# Patient Record
Sex: FEMALE | Race: WHITE | NOT HISPANIC OR LATINO | Employment: PART TIME | URBAN - METROPOLITAN AREA
[De-identification: names, ages, dates, MRNs, and addresses within clinical notes are randomized per-mention and may not be internally consistent; named-entity substitution may affect disease eponyms.]

---

## 2017-01-07 ENCOUNTER — HOSPITAL ENCOUNTER (EMERGENCY)
Facility: HOSPITAL | Age: 42
Discharge: HOME | End: 2017-01-07
Attending: EMERGENCY MEDICINE | Admitting: EMERGENCY MEDICINE
Payer: COMMERCIAL

## 2017-01-07 ENCOUNTER — APPOINTMENT (EMERGENCY)
Dept: ULTRASOUND IMAGING | Facility: HOSPITAL | Age: 42
End: 2017-01-07
Payer: COMMERCIAL

## 2017-01-07 VITALS
HEART RATE: 72 BPM | WEIGHT: 160 LBS | TEMPERATURE: 97.5 F | OXYGEN SATURATION: 99 % | DIASTOLIC BLOOD PRESSURE: 62 MMHG | RESPIRATION RATE: 16 BRPM | SYSTOLIC BLOOD PRESSURE: 121 MMHG

## 2017-01-07 DIAGNOSIS — O46.90 VAGINAL BLEEDING IN PREGNANCY: Primary | ICD-10-CM

## 2017-01-07 LAB
B-HCG SERPL-ACNC: ABNORMAL MIU/ML
BILIRUB UR QL STRIP: NEGATIVE
CLARITY UR: CLEAR
COLOR UR: YELLOW
COLOR, POC: NORMAL
GLUCOSE UR STRIP-MCNC: NEGATIVE MG/DL
HCG UR QL: POSITIVE
HGB UR QL STRIP.AUTO: NEGATIVE
KETONES UR STRIP-MCNC: NEGATIVE MG/DL
LEUKOCYTE ESTERASE UR QL STRIP: NEGATIVE
NITRITE UR QL STRIP: NEGATIVE
PH UR STRIP.AUTO: 7 [PH] (ref 4.5–8)
PROT UR STRIP-MCNC: NEGATIVE MG/DL
SP GR UR STRIP.AUTO: 1.02 (ref 1–1.03)
UROBILINOGEN UR QL STRIP.AUTO: 0.2 E.U./DL

## 2017-01-07 PROCEDURE — 81025 URINE PREGNANCY TEST: CPT | Performed by: PHYSICIAN ASSISTANT

## 2017-01-07 PROCEDURE — 76801 OB US < 14 WKS SINGLE FETUS: CPT

## 2017-01-07 PROCEDURE — 81002 URINALYSIS NONAUTO W/O SCOPE: CPT | Performed by: PHYSICIAN ASSISTANT

## 2017-01-07 PROCEDURE — 96374 THER/PROPH/DIAG INJ IV PUSH: CPT

## 2017-01-07 PROCEDURE — 81003 URINALYSIS AUTO W/O SCOPE: CPT

## 2017-01-07 PROCEDURE — 36415 COLL VENOUS BLD VENIPUNCTURE: CPT | Performed by: PHYSICIAN ASSISTANT

## 2017-01-07 PROCEDURE — 84702 CHORIONIC GONADOTROPIN TEST: CPT | Performed by: PHYSICIAN ASSISTANT

## 2017-01-07 PROCEDURE — 99284 EMERGENCY DEPT VISIT MOD MDM: CPT

## 2017-01-07 PROCEDURE — 96361 HYDRATE IV INFUSION ADD-ON: CPT

## 2017-01-07 RX ORDER — ONDANSETRON 2 MG/ML
4 INJECTION INTRAMUSCULAR; INTRAVENOUS ONCE
Status: COMPLETED | OUTPATIENT
Start: 2017-01-07 | End: 2017-01-07

## 2017-01-07 RX ADMIN — SODIUM CHLORIDE 1000 ML: 0.9 INJECTION, SOLUTION INTRAVENOUS at 09:42

## 2017-01-07 RX ADMIN — ONDANSETRON 4 MG: 2 INJECTION INTRAMUSCULAR; INTRAVENOUS at 09:42

## 2017-02-06 ENCOUNTER — ALLSCRIPTS OFFICE VISIT (OUTPATIENT)
Dept: OTHER | Facility: OTHER | Age: 42
End: 2017-02-06

## 2017-03-06 RX ORDER — IBUPROFEN 200 MG
200-800 TABLET ORAL EVERY 6 HOURS PRN
COMMUNITY
End: 2017-05-03

## 2017-03-07 ENCOUNTER — ANESTHESIA EVENT (OUTPATIENT)
Dept: PERIOP | Facility: HOSPITAL | Age: 42
End: 2017-03-07
Payer: COMMERCIAL

## 2017-03-08 ENCOUNTER — ANESTHESIA (OUTPATIENT)
Dept: PERIOP | Facility: HOSPITAL | Age: 42
End: 2017-03-08
Payer: COMMERCIAL

## 2017-03-08 ENCOUNTER — HOSPITAL ENCOUNTER (OUTPATIENT)
Facility: HOSPITAL | Age: 42
Setting detail: OUTPATIENT SURGERY
Discharge: HOME | End: 2017-03-08
Attending: OBSTETRICS & GYNECOLOGY | Admitting: OBSTETRICS & GYNECOLOGY
Payer: COMMERCIAL

## 2017-03-08 VITALS
WEIGHT: 160 LBS | HEIGHT: 66 IN | HEART RATE: 62 BPM | SYSTOLIC BLOOD PRESSURE: 115 MMHG | TEMPERATURE: 97.6 F | OXYGEN SATURATION: 100 % | BODY MASS INDEX: 25.71 KG/M2 | RESPIRATION RATE: 14 BRPM | DIASTOLIC BLOOD PRESSURE: 72 MMHG

## 2017-03-08 DIAGNOSIS — Z30.2 ENCOUNTER FOR STERILIZATION: ICD-10-CM

## 2017-03-08 LAB — EXT PREGNANCY TEST URINE: NEGATIVE

## 2017-03-08 PROCEDURE — 88302 TISSUE EXAM BY PATHOLOGIST: CPT | Performed by: OBSTETRICS & GYNECOLOGY

## 2017-03-08 PROCEDURE — 81025 URINE PREGNANCY TEST: CPT | Performed by: OBSTETRICS & GYNECOLOGY

## 2017-03-08 RX ORDER — ONDANSETRON 2 MG/ML
4 INJECTION INTRAMUSCULAR; INTRAVENOUS ONCE
Status: DISCONTINUED | OUTPATIENT
Start: 2017-03-08 | End: 2017-03-08 | Stop reason: HOSPADM

## 2017-03-08 RX ORDER — KETOROLAC TROMETHAMINE 30 MG/ML
INJECTION, SOLUTION INTRAMUSCULAR; INTRAVENOUS AS NEEDED
Status: DISCONTINUED | OUTPATIENT
Start: 2017-03-08 | End: 2017-03-08 | Stop reason: SURG

## 2017-03-08 RX ORDER — PROPOFOL 10 MG/ML
INJECTION, EMULSION INTRAVENOUS AS NEEDED
Status: DISCONTINUED | OUTPATIENT
Start: 2017-03-08 | End: 2017-03-08 | Stop reason: SURG

## 2017-03-08 RX ORDER — IBUPROFEN 600 MG/1
600 TABLET ORAL EVERY 6 HOURS PRN
Status: DISCONTINUED | OUTPATIENT
Start: 2017-03-08 | End: 2017-03-08 | Stop reason: HOSPADM

## 2017-03-08 RX ORDER — MIDAZOLAM HYDROCHLORIDE 1 MG/ML
INJECTION INTRAMUSCULAR; INTRAVENOUS AS NEEDED
Status: DISCONTINUED | OUTPATIENT
Start: 2017-03-08 | End: 2017-03-08 | Stop reason: SURG

## 2017-03-08 RX ORDER — FENTANYL CITRATE 50 UG/ML
INJECTION, SOLUTION INTRAMUSCULAR; INTRAVENOUS AS NEEDED
Status: DISCONTINUED | OUTPATIENT
Start: 2017-03-08 | End: 2017-03-08 | Stop reason: SURG

## 2017-03-08 RX ORDER — ONDANSETRON 2 MG/ML
INJECTION INTRAMUSCULAR; INTRAVENOUS AS NEEDED
Status: DISCONTINUED | OUTPATIENT
Start: 2017-03-08 | End: 2017-03-08 | Stop reason: SURG

## 2017-03-08 RX ORDER — IBUPROFEN 600 MG/1
600 TABLET ORAL EVERY 6 HOURS PRN
Qty: 15 TABLET | Refills: 0 | Status: SHIPPED | OUTPATIENT
Start: 2017-03-08 | End: 2017-05-03

## 2017-03-08 RX ORDER — FENTANYL CITRATE/PF 50 MCG/ML
25 SYRINGE (ML) INJECTION
Status: COMPLETED | OUTPATIENT
Start: 2017-03-08 | End: 2017-03-08

## 2017-03-08 RX ORDER — SODIUM CHLORIDE 9 MG/ML
125 INJECTION, SOLUTION INTRAVENOUS CONTINUOUS
Status: DISCONTINUED | OUTPATIENT
Start: 2017-03-08 | End: 2017-03-08 | Stop reason: HOSPADM

## 2017-03-08 RX ORDER — OXYCODONE HYDROCHLORIDE AND ACETAMINOPHEN 5; 325 MG/1; MG/1
1 TABLET ORAL EVERY 4 HOURS PRN
Status: DISCONTINUED | OUTPATIENT
Start: 2017-03-08 | End: 2017-03-08 | Stop reason: HOSPADM

## 2017-03-08 RX ORDER — BUPIVACAINE HYDROCHLORIDE 5 MG/ML
INJECTION, SOLUTION EPIDURAL; INTRACAUDAL AS NEEDED
Status: DISCONTINUED | OUTPATIENT
Start: 2017-03-08 | End: 2017-03-08 | Stop reason: HOSPADM

## 2017-03-08 RX ORDER — ROCURONIUM BROMIDE 10 MG/ML
INJECTION, SOLUTION INTRAVENOUS AS NEEDED
Status: DISCONTINUED | OUTPATIENT
Start: 2017-03-08 | End: 2017-03-08 | Stop reason: SURG

## 2017-03-08 RX ORDER — GLYCOPYRROLATE 0.2 MG/ML
INJECTION INTRAMUSCULAR; INTRAVENOUS AS NEEDED
Status: DISCONTINUED | OUTPATIENT
Start: 2017-03-08 | End: 2017-03-08 | Stop reason: SURG

## 2017-03-08 RX ORDER — MAGNESIUM HYDROXIDE 1200 MG/15ML
LIQUID ORAL AS NEEDED
Status: DISCONTINUED | OUTPATIENT
Start: 2017-03-08 | End: 2017-03-08 | Stop reason: HOSPADM

## 2017-03-08 RX ORDER — OXYCODONE HYDROCHLORIDE AND ACETAMINOPHEN 5; 325 MG/1; MG/1
1 TABLET ORAL EVERY 4 HOURS PRN
Qty: 12 TABLET | Refills: 0 | Status: SHIPPED | OUTPATIENT
Start: 2017-03-08 | End: 2017-03-11

## 2017-03-08 RX ADMIN — ONDANSETRON HYDROCHLORIDE 8 MG: 2 INJECTION, SOLUTION INTRAVENOUS at 14:56

## 2017-03-08 RX ADMIN — ROCURONIUM BROMIDE 30 MG: 10 INJECTION, SOLUTION INTRAVENOUS at 14:39

## 2017-03-08 RX ADMIN — PROPOFOL 200 MG: 10 INJECTION, EMULSION INTRAVENOUS at 14:39

## 2017-03-08 RX ADMIN — FENTANYL CITRATE 50 MCG: 50 INJECTION, SOLUTION INTRAMUSCULAR; INTRAVENOUS at 15:00

## 2017-03-08 RX ADMIN — GLYCOPYRROLATE 0.4 MG: 0.2 INJECTION, SOLUTION INTRAMUSCULAR; INTRAVENOUS at 15:26

## 2017-03-08 RX ADMIN — HYDROMORPHONE HYDROCHLORIDE 0.5 MG: 1 INJECTION, SOLUTION INTRAMUSCULAR; INTRAVENOUS; SUBCUTANEOUS at 16:32

## 2017-03-08 RX ADMIN — PROPOFOL 50 MG: 10 INJECTION, EMULSION INTRAVENOUS at 15:17

## 2017-03-08 RX ADMIN — FENTANYL CITRATE 25 MCG: 50 INJECTION INTRAMUSCULAR; INTRAVENOUS at 16:11

## 2017-03-08 RX ADMIN — KETOROLAC TROMETHAMINE 30 MG: 30 INJECTION, SOLUTION INTRAMUSCULAR at 15:28

## 2017-03-08 RX ADMIN — FENTANYL CITRATE 25 MCG: 50 INJECTION INTRAMUSCULAR; INTRAVENOUS at 16:06

## 2017-03-08 RX ADMIN — HYDROMORPHONE HYDROCHLORIDE 0.5 MG: 1 INJECTION, SOLUTION INTRAMUSCULAR; INTRAVENOUS; SUBCUTANEOUS at 16:27

## 2017-03-08 RX ADMIN — MIDAZOLAM HYDROCHLORIDE 2 MG: 1 INJECTION, SOLUTION INTRAMUSCULAR; INTRAVENOUS at 14:37

## 2017-03-08 RX ADMIN — OXYCODONE HYDROCHLORIDE AND ACETAMINOPHEN 1 TABLET: 5; 325 TABLET ORAL at 17:15

## 2017-03-08 RX ADMIN — FENTANYL CITRATE 25 MCG: 50 INJECTION INTRAMUSCULAR; INTRAVENOUS at 16:21

## 2017-03-08 RX ADMIN — NEOSTIGMINE METHYLSULFATE 3 MG: 1 INJECTION INTRAMUSCULAR; INTRAVENOUS; SUBCUTANEOUS at 15:26

## 2017-03-08 RX ADMIN — SODIUM CHLORIDE: 0.9 INJECTION, SOLUTION INTRAVENOUS at 15:24

## 2017-03-08 RX ADMIN — SODIUM CHLORIDE 125 ML/HR: 0.9 INJECTION, SOLUTION INTRAVENOUS at 14:08

## 2017-03-08 RX ADMIN — FENTANYL CITRATE 50 MCG: 50 INJECTION, SOLUTION INTRAMUSCULAR; INTRAVENOUS at 15:17

## 2017-03-08 RX ADMIN — DEXAMETHASONE SODIUM PHOSPHATE 8 MG: 10 INJECTION INTRAMUSCULAR; INTRAVENOUS at 14:56

## 2017-03-08 RX ADMIN — FENTANYL CITRATE 25 MCG: 50 INJECTION INTRAMUSCULAR; INTRAVENOUS at 16:16

## 2017-03-08 RX ADMIN — FENTANYL CITRATE 100 MCG: 50 INJECTION, SOLUTION INTRAMUSCULAR; INTRAVENOUS at 14:39

## 2017-04-17 ENCOUNTER — GENERIC CONVERSION - ENCOUNTER (OUTPATIENT)
Dept: OTHER | Facility: OTHER | Age: 42
End: 2017-04-17

## 2017-04-18 ENCOUNTER — ALLSCRIPTS OFFICE VISIT (OUTPATIENT)
Dept: OTHER | Facility: OTHER | Age: 42
End: 2017-04-18

## 2017-05-03 ENCOUNTER — APPOINTMENT (EMERGENCY)
Dept: CT IMAGING | Facility: HOSPITAL | Age: 42
End: 2017-05-03
Payer: COMMERCIAL

## 2017-05-03 ENCOUNTER — HOSPITAL ENCOUNTER (EMERGENCY)
Facility: HOSPITAL | Age: 42
Discharge: HOME | End: 2017-05-03
Attending: EMERGENCY MEDICINE
Payer: COMMERCIAL

## 2017-05-03 VITALS
SYSTOLIC BLOOD PRESSURE: 137 MMHG | BODY MASS INDEX: 25.82 KG/M2 | TEMPERATURE: 98.3 F | OXYGEN SATURATION: 100 % | DIASTOLIC BLOOD PRESSURE: 65 MMHG | WEIGHT: 160 LBS | RESPIRATION RATE: 16 BRPM | HEART RATE: 53 BPM

## 2017-05-03 DIAGNOSIS — L03.213 PERIORBITAL CELLULITIS OF LEFT EYE: ICD-10-CM

## 2017-05-03 DIAGNOSIS — H00.015 HORDEOLUM EXTERNUM LEFT LOWER EYELID: Primary | ICD-10-CM

## 2017-05-03 LAB
ANION GAP SERPL CALCULATED.3IONS-SCNC: 9 MMOL/L (ref 4–13)
BASOPHILS # BLD AUTO: 0.05 THOUSANDS/ΜL (ref 0–0.1)
BASOPHILS NFR BLD AUTO: 1 % (ref 0–1)
BUN SERPL-MCNC: 15 MG/DL (ref 5–25)
CALCIUM SERPL-MCNC: 9.8 MG/DL (ref 8.3–10.1)
CHLORIDE SERPL-SCNC: 106 MMOL/L (ref 100–108)
CO2 SERPL-SCNC: 25 MMOL/L (ref 21–32)
CREAT SERPL-MCNC: 0.8 MG/DL (ref 0.6–1.3)
EOSINOPHIL # BLD AUTO: 0.17 THOUSAND/ΜL (ref 0–0.61)
EOSINOPHIL NFR BLD AUTO: 2 % (ref 0–6)
ERYTHROCYTE [DISTWIDTH] IN BLOOD BY AUTOMATED COUNT: 13.3 % (ref 11.6–15.1)
GFR SERPL CREATININE-BSD FRML MDRD: >60 ML/MIN/1.73SQ M
GLUCOSE SERPL-MCNC: 82 MG/DL (ref 65–140)
HCT VFR BLD AUTO: 38 % (ref 34.8–46.1)
HGB BLD-MCNC: 13.1 G/DL (ref 11.5–15.4)
LYMPHOCYTES # BLD AUTO: 2.41 THOUSANDS/ΜL (ref 0.6–4.47)
LYMPHOCYTES NFR BLD AUTO: 27 % (ref 14–44)
MCH RBC QN AUTO: 30.8 PG (ref 26.8–34.3)
MCHC RBC AUTO-ENTMCNC: 34.5 G/DL (ref 31.4–37.4)
MCV RBC AUTO: 89 FL (ref 82–98)
MONOCYTES # BLD AUTO: 0.77 THOUSAND/ΜL (ref 0.17–1.22)
MONOCYTES NFR BLD AUTO: 9 % (ref 4–12)
NEUTROPHILS # BLD AUTO: 5.43 THOUSANDS/ΜL (ref 1.85–7.62)
NEUTS SEG NFR BLD AUTO: 61 % (ref 43–75)
NRBC BLD AUTO-RTO: 0 /100 WBCS
PLATELET # BLD AUTO: 219 THOUSANDS/UL (ref 149–390)
PMV BLD AUTO: 10 FL (ref 8.9–12.7)
POTASSIUM SERPL-SCNC: 4.1 MMOL/L (ref 3.5–5.3)
RBC # BLD AUTO: 4.26 MILLION/UL (ref 3.81–5.12)
SODIUM SERPL-SCNC: 140 MMOL/L (ref 136–145)
WBC # BLD AUTO: 8.83 THOUSAND/UL (ref 4.31–10.16)

## 2017-05-03 PROCEDURE — 96374 THER/PROPH/DIAG INJ IV PUSH: CPT

## 2017-05-03 PROCEDURE — 85025 COMPLETE CBC W/AUTO DIFF WBC: CPT | Performed by: EMERGENCY MEDICINE

## 2017-05-03 PROCEDURE — 96375 TX/PRO/DX INJ NEW DRUG ADDON: CPT

## 2017-05-03 PROCEDURE — 70487 CT MAXILLOFACIAL W/DYE: CPT

## 2017-05-03 PROCEDURE — 36415 COLL VENOUS BLD VENIPUNCTURE: CPT | Performed by: EMERGENCY MEDICINE

## 2017-05-03 PROCEDURE — 99284 EMERGENCY DEPT VISIT MOD MDM: CPT

## 2017-05-03 PROCEDURE — 80048 BASIC METABOLIC PNL TOTAL CA: CPT | Performed by: EMERGENCY MEDICINE

## 2017-05-03 RX ORDER — TOBRAMYCIN AND DEXAMETHASONE 3; 1 MG/ML; MG/ML
1 SUSPENSION/ DROPS OPHTHALMIC
Qty: 5 ML | Refills: 0 | Status: SHIPPED | OUTPATIENT
Start: 2017-05-03 | End: 2018-09-17 | Stop reason: ALTCHOICE

## 2017-05-03 RX ORDER — ONDANSETRON 2 MG/ML
4 INJECTION INTRAMUSCULAR; INTRAVENOUS ONCE
Status: COMPLETED | OUTPATIENT
Start: 2017-05-03 | End: 2017-05-03

## 2017-05-03 RX ORDER — CEPHALEXIN 250 MG/1
500 CAPSULE ORAL ONCE
Status: COMPLETED | OUTPATIENT
Start: 2017-05-03 | End: 2017-05-03

## 2017-05-03 RX ORDER — CEPHALEXIN 500 MG/1
500 CAPSULE ORAL 3 TIMES DAILY
Qty: 15 CAPSULE | Refills: 0 | Status: SHIPPED | OUTPATIENT
Start: 2017-05-03 | End: 2017-05-08

## 2017-05-03 RX ORDER — TRAMADOL HYDROCHLORIDE 50 MG/1
50 TABLET ORAL EVERY 6 HOURS PRN
Qty: 10 TABLET | Refills: 0 | Status: SHIPPED | OUTPATIENT
Start: 2017-05-03 | End: 2017-05-13

## 2017-05-03 RX ORDER — KETOROLAC TROMETHAMINE 30 MG/ML
15 INJECTION, SOLUTION INTRAMUSCULAR; INTRAVENOUS ONCE
Status: COMPLETED | OUTPATIENT
Start: 2017-05-03 | End: 2017-05-03

## 2017-05-03 RX ADMIN — ONDANSETRON 4 MG: 2 INJECTION INTRAMUSCULAR; INTRAVENOUS at 13:18

## 2017-05-03 RX ADMIN — KETOROLAC TROMETHAMINE 15 MG: 30 INJECTION, SOLUTION INTRAMUSCULAR at 13:19

## 2017-05-03 RX ADMIN — CEPHALEXIN 500 MG: 250 CAPSULE ORAL at 15:36

## 2017-05-03 RX ADMIN — IOHEXOL 85 ML: 350 INJECTION, SOLUTION INTRAVENOUS at 14:19

## 2017-06-15 ENCOUNTER — GENERIC CONVERSION - ENCOUNTER (OUTPATIENT)
Dept: OTHER | Facility: OTHER | Age: 42
End: 2017-06-15

## 2017-08-08 ENCOUNTER — GENERIC CONVERSION - ENCOUNTER (OUTPATIENT)
Dept: OTHER | Facility: OTHER | Age: 42
End: 2017-08-08

## 2018-01-10 NOTE — MISCELLANEOUS
Message   Date: 24 Jun 2016 8:31 AM EST, Recorded By: Cierra De La O For: Maged Valenzuela, Self   Phone: [phone] (Home), [phone] (Work)   Reason: Medical Complaint   pts IUD fell out at work yesterday    pt will come in for an exam and to talk about her options           Active Problems    1  AMA (advanced maternal age) multigravida 35+ (80 56) (O1 46)   2  Birth control counseling (V25 09) (Z30 9)   3  's permit physical examination (V70 3) (Z02 4)   4  Encounter for IUD insertion (V25 11) (Z30 430)   5  Irregular menses (626 4) (N92 6)   6  Need for influenza vaccination (V04 81) (Z23)   7  Screening for hyperlipidemia (V77 91) (Z13 220)   8  Subchorionic bleed (656 80) (O46 8X9,O41 8X90)   9  Tobacco use (305 1) (Z72 0)    Current Meds   1  Mirena IUD; Therapy: (Recorded:[id number]) to Recorded    Allergies    1  Penicillins    2  No Known Environmental Allergies   3   No Known Food Allergies    Signatures   Electronically signed by : [facility], ; Jun 24 2016  8:33AM EST                       (Author)

## 2018-01-11 NOTE — MISCELLANEOUS
Message   Date: 17 Apr 2017 11:15 AM EST, Recorded By: Ernst White For: Brittani Read, Self   Phone: [phone] (Home), [phone] (Work)   Reason: Medical Complaint   pts incision at her umbellical area hurts and pt said that it is oozing brown    pt will come in for incision check           Active Problems    1  Abdominal pain (789 00) (R10 9)   2  Birth control counseling (V25 09) (Z30 09)   3  Chronic neck pain (723 1,338 29) (M54 2,G89 29)   4  Diarrhea (787 91) (R19 7)   5  Irregular menses (626 4) (N92 6)   6  Nausea (787 02) (R11 0)   7  Thyromegaly (240 9) (E04 9)   8  Tobacco use (305 1) (Z72 0)   9  Weight loss (783 21) (R63 4)    Allergies    1  Penicillins    2  No Known Environmental Allergies   3   No Known Food Allergies    Signatures   Electronically signed by : Kayleen Delgadillo, ; Apr 17 2017 11:16AM EST                       (Author)

## 2018-01-13 VITALS
BODY MASS INDEX: 26.38 KG/M2 | HEIGHT: 66 IN | SYSTOLIC BLOOD PRESSURE: 110 MMHG | DIASTOLIC BLOOD PRESSURE: 80 MMHG | WEIGHT: 164.13 LBS

## 2018-01-13 VITALS
HEIGHT: 66 IN | WEIGHT: 177.25 LBS | SYSTOLIC BLOOD PRESSURE: 108 MMHG | DIASTOLIC BLOOD PRESSURE: 76 MMHG | BODY MASS INDEX: 28.49 KG/M2

## 2018-02-26 ENCOUNTER — OFFICE VISIT (OUTPATIENT)
Dept: URGENT CARE | Facility: MEDICAL CENTER | Age: 43
End: 2018-02-26
Payer: COMMERCIAL

## 2018-02-26 VITALS
TEMPERATURE: 98.2 F | OXYGEN SATURATION: 100 % | HEIGHT: 67 IN | WEIGHT: 158 LBS | DIASTOLIC BLOOD PRESSURE: 83 MMHG | RESPIRATION RATE: 20 BRPM | SYSTOLIC BLOOD PRESSURE: 127 MMHG | HEART RATE: 68 BPM | BODY MASS INDEX: 24.8 KG/M2

## 2018-02-26 DIAGNOSIS — S16.1XXA CERVICAL STRAIN, ACUTE, INITIAL ENCOUNTER: Primary | ICD-10-CM

## 2018-02-26 PROCEDURE — 99204 OFFICE O/P NEW MOD 45 MIN: CPT | Performed by: FAMILY MEDICINE

## 2018-02-26 RX ORDER — CYCLOBENZAPRINE HCL 5 MG
5 TABLET ORAL 3 TIMES DAILY PRN
Qty: 30 TABLET | Refills: 0 | Status: SHIPPED | OUTPATIENT
Start: 2018-02-26 | End: 2018-03-01 | Stop reason: SDUPTHER

## 2018-02-26 RX ORDER — PANTOPRAZOLE SODIUM 20 MG/1
20 TABLET, DELAYED RELEASE ORAL DAILY
COMMUNITY
End: 2018-09-17 | Stop reason: ALTCHOICE

## 2018-02-26 RX ORDER — RANITIDINE HCL 75 MG
75 TABLET ORAL 2 TIMES DAILY
COMMUNITY
End: 2018-09-17 | Stop reason: ALTCHOICE

## 2018-02-26 RX ORDER — NAPROXEN 500 MG/1
500 TABLET ORAL 2 TIMES DAILY WITH MEALS
Qty: 20 TABLET | Refills: 0 | Status: SHIPPED | OUTPATIENT
Start: 2018-02-26 | End: 2018-09-17 | Stop reason: ALTCHOICE

## 2018-02-26 RX ORDER — KETOROLAC TROMETHAMINE 30 MG/ML
60 INJECTION, SOLUTION INTRAMUSCULAR; INTRAVENOUS ONCE
Status: COMPLETED | OUTPATIENT
Start: 2018-02-26 | End: 2018-02-26

## 2018-02-26 RX ADMIN — KETOROLAC TROMETHAMINE 60 MG: 30 INJECTION, SOLUTION INTRAMUSCULAR; INTRAVENOUS at 13:52

## 2018-02-26 NOTE — PATIENT INSTRUCTIONS
Patient received Toradol 60 mg IM in the office  I prescribed cyclobenzaprine 5 mg q 8 hours, naproxen 500 mg q 12 hours  Strongly encouraged patient to apply warm compress to affected area for 15-20 minutes as needed  Follow-up per primary care provider if pain worsens  Acute Neck Pain   WHAT YOU NEED TO KNOW:   Acute neck pain starts suddenly, increases quickly, and goes away in a few days  The pain may come and go, or be worse with certain movements  The pain may be only in your neck, or it may move to your arms, back, or shoulders  You may also have pain that starts in another body area and moves to your neck  DISCHARGE INSTRUCTIONS:   Return to the emergency department if:   · You have an injury that causes neck pain and shooting pain down your arms or legs  · Your neck pain suddenly becomes severe  · You have neck pain along with numbness, tingling, or weakness in your arms or legs  · You have a stiff neck, a headache, and a fever  Contact your healthcare provider if:   · You have new or worsening symptoms  · Your symptoms continue even after treatment  · You have questions or concerns about your condition or care  Medicines:   · NSAIDs , such as ibuprofen, help decrease swelling, pain, and fever  This medicine is available without a doctor's order  Ask your healthcare provider which medicine to take and how often to take it  Follow directions  NSAIDs can cause stomach bleeding or kidney problems if not taken correctly  If you take blood thinner medicine, always ask if NSAIDs are safe for you  · Acetaminophen  helps decrease pain and fever  Ask your healthcare provider how much to take and how often to take it  Follow directions  Acetaminophen can cause liver damage if not taken correctly  · Steroid medicine  may be used to reduce inflammation  This can help relieve pain caused by swelling  · Take your medicine as directed    Contact your healthcare provider if you think your medicine is not helping or if you have side effects  Tell him or her if you are allergic to any medicine  Keep a list of the medicines, vitamins, and herbs you take  Include the amounts, and when and why you take them  Bring the list or the pill bottles to follow-up visits  Carry your medicine list with you in case of an emergency  Manage or prevent acute neck pain:   · Rest your neck as directed  Do not make sudden movements, such as turning your head quickly  Your healthcare provider may recommend you wear a cervical collar for a short time  The collar will prevent you from moving your head  This will give your neck time to heal if an injury is causing your neck pain  Ask your healthcare provider when you can return to sports or other normal daily activities  · Apply heat as directed  Heat helps relieve pain and swelling  Use a heat wrap, or soak a small towel in warm water  Wring out the extra water  Apply the heat wrap or towel for 20 minutes every hour, or as directed  · Apply ice as directed  Ice helps relieve pain and swelling, and can help prevent tissue damage  Use an ice pack, or put ice in a bag  Cover the ice pack or back with a towel before you apply it to your neck  Apply the ice pack or ice for 15 minutes every hour, or as directed  Your healthcare provider can tell you how often to apply ice  · Do neck exercises as directed  Neck exercises help strengthen the muscles and increase range of motion  Your healthcare provider will tell you which exercises are right for you  He may give you instructions, or he may recommend that you work with a physical therapist  Your healthcare provider or therapist can make sure you are doing the exercises correctly  · Maintain good posture  Try to keep your head and shoulders lifted when you sit  If you work in front of a computer, make sure the monitor is at the right level  You should not need to look up down to see the screen   You should also not have to lean forward to be able to read what is on the screen  Make sure your keyboard, mouse, and other computer items are placed where you do not have to extend your shoulder to reach them  Get up often if you work in front of a computer or sit for long periods of time  Stretch or walk around to keep your neck muscles loose  Follow up with your healthcare provider as directed: Your healthcare provider may refer you to a specialist if your pain does not get better with treatment  Write down your questions so you remember to ask them during your visits  © 2017 [facility] Information is for End User's use only and may not be sold, redistributed or otherwise used for commercial purposes  All illustrations and images included in CareNotes® are the copyrighted property of [employer]  or [address]  The above information is an  only  It is not intended as medical advice for individual conditions or treatments  Talk to your doctor, nurse or pharmacist before following any medical regimen to see if it is safe and effective for you

## 2018-02-26 NOTE — PROGRESS NOTES
[facility] [facility]        NAME: Li Drummond is a 43 y o  female  : 1975    MRN: [id number]  DATE: 2018  TIME: 1:38 PM    Assessment and Plan   Cervical strain, acute, initial encounter [S16  1XXA]  1  Cervical strain, acute, initial encounter  ketorolac (TORADOL) injection 60 mg    naproxen (EC NAPROSYN) 500 MG EC tablet    cyclobenzaprine (FLEXERIL) 5 mg tablet         Patient Instructions       Follow up with PCP in 3-5 days  Proceed to  ER if symptoms worsen  Chief Complaint     Chief Complaint   Patient presents with    Neck Pain     Sudden onset at 0700 today  Took Aleve at 0800; no relief         History of Present Illness       Patient here with acute neck pain since 7:00 a m  this morning  Describes pain is severe, sharp  Mainly localized to the right neck area  Describes accompanying stiffness  She has pain upon flexing or turning her neck she took 1 leave this more with no noticeable improvement  She is also apply heat and has been stretching with no significant improvement  Denies history of similar problem in the past   Denies any numbness or weakness of the upper extremity  Neck Pain          Review of Systems   Review of Systems   Constitutional: Negative  Musculoskeletal: Positive for neck pain  Neurological: Negative            Current Medications       Current Outpatient Prescriptions:     pantoprazole (PROTONIX) 20 mg tablet, Take 20 mg by mouth daily, Disp: , Rfl:     ranitidine (ZANTAC) 75 MG tablet, Take 75 mg by mouth 2 (two) times a day, Disp: , Rfl:     cyclobenzaprine (FLEXERIL) 5 mg tablet, Take 1 tablet (5 mg total) by mouth 3 (three) times a day as needed for muscle spasms, Disp: 30 tablet, Rfl: 0    naproxen (EC NAPROSYN) 500 MG EC tablet, Take 1 tablet (500 mg total) by mouth 2 (two) times a day with meals for 10 days, Disp: 20 tablet, Rfl: 0    tobramycin-dexamethasone (TOBRADEX) ophthalmic suspension, Administer 1 drop into the left eye every 4 (four) hours while awake for 7 days, Disp: 5 mL, Rfl: 0    Current Facility-Administered Medications:     ketorolac (TORADOL) injection 60 mg, 60 mg, Intramuscular, Once, Wiliam Khan MD    Current Allergies     Allergies as of 2018 - Reviewed 2018   Allergen Reaction Noted    Penicillins Rash 2013            The following portions of the patient's history were reviewed and updated as appropriate: allergies, current medications, past family history, past medical history, past social history, past surgical history and problem list      Past Medical History:   Diagnosis Date    Abdominal pain     Chronic pain disorder     neck pain    GERD (gastroesophageal reflux disease)     Heartburn     occ    Mastodynia     Obesity     Request for sterilization     Thyromegaly        Past Surgical History:   Procedure Laterality Date    INDUCED       MOUTH SURGERY      [place] LAP,RMV  ADNEXAL STRUCTURE Bilateral 3/8/2017    Procedure: SALPINGECTOMY;  Surgeon: Christiano Grissom MD;  [facility]: [facility] OR;  Service: Gynecology       No family history on file  Medications have been verified  Objective   /83   Pulse 68   Temp 98 2 °F (36 8 °C)   Resp 20   Ht 5' 7" (1 702 m)   Wt 71 7 kg (158 lb)   SpO2 100%   BMI 24 75 kg/m²        Physical Exam     Physical Exam   Constitutional: She appears well-developed and well-nourished  Musculoskeletal:   C-spine-flexion to 20°, extension to 10°, right lateral rotation to approximately 10°, left lateral rotation 45°  There is point tenderness upon palpation of the cervical spine and right paraspinal muscle  Extremity-upper, good strength and tone, 5/5  Nursing note and vitals reviewed

## 2018-03-01 ENCOUNTER — OFFICE VISIT (OUTPATIENT)
Dept: URGENT CARE | Facility: CLINIC | Age: 43
End: 2018-03-01
Payer: COMMERCIAL

## 2018-03-01 ENCOUNTER — APPOINTMENT (OUTPATIENT)
Dept: RADIOLOGY | Facility: CLINIC | Age: 43
End: 2018-03-01
Payer: COMMERCIAL

## 2018-03-01 VITALS
DIASTOLIC BLOOD PRESSURE: 87 MMHG | TEMPERATURE: 98.4 F | SYSTOLIC BLOOD PRESSURE: 141 MMHG | BODY MASS INDEX: 25.58 KG/M2 | HEIGHT: 67 IN | RESPIRATION RATE: 18 BRPM | WEIGHT: 163 LBS | OXYGEN SATURATION: 100 %

## 2018-03-01 DIAGNOSIS — S16.1XXA CERVICAL STRAIN, ACUTE, INITIAL ENCOUNTER: ICD-10-CM

## 2018-03-01 DIAGNOSIS — M25.512 LEFT SHOULDER PAIN, UNSPECIFIED CHRONICITY: Primary | ICD-10-CM

## 2018-03-01 PROCEDURE — 73030 X-RAY EXAM OF SHOULDER: CPT

## 2018-03-01 PROCEDURE — 99213 OFFICE O/P EST LOW 20 MIN: CPT | Performed by: FAMILY MEDICINE

## 2018-03-01 PROCEDURE — 73000 X-RAY EXAM OF COLLAR BONE: CPT

## 2018-03-01 PROCEDURE — 96372 THER/PROPH/DIAG INJ SC/IM: CPT | Performed by: FAMILY MEDICINE

## 2018-03-01 RX ORDER — KETOROLAC TROMETHAMINE 30 MG/ML
30 INJECTION, SOLUTION INTRAMUSCULAR; INTRAVENOUS ONCE
Status: COMPLETED | OUTPATIENT
Start: 2018-03-01 | End: 2018-03-01

## 2018-03-01 RX ORDER — CYCLOBENZAPRINE HCL 5 MG
5 TABLET ORAL 3 TIMES DAILY PRN
Qty: 30 TABLET | Refills: 0 | Status: SHIPPED | OUTPATIENT
Start: 2018-03-01 | End: 2018-04-26 | Stop reason: ALTCHOICE

## 2018-03-01 RX ADMIN — KETOROLAC TROMETHAMINE 30 MG: 30 INJECTION, SOLUTION INTRAMUSCULAR; INTRAVENOUS at 10:48
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
## 2018-03-01 NOTE — PATIENT INSTRUCTIONS
The x-rays appear normal   As we discussed, this appears to be mainly muscle spasms  Apply heat to the area, use a muscle relaxer as written  Follow-up with your family physician/chiropractor for further care

## 2018-04-11 ENCOUNTER — TELEPHONE (OUTPATIENT)
Dept: INTERNAL MEDICINE CLINIC | Facility: CLINIC | Age: 43
End: 2018-04-11

## 2018-04-11 NOTE — TELEPHONE ENCOUNTER
Patient has an appointment today with [facility]  Phone#[phone]  Fax# [phone]  This is a follow up from ED  Visit at [facility] and [employer]  She needs a referral for an appointment today at 2:00  Diagnosis: muscles spasms   Please fax ASAP

## 2018-04-26 ENCOUNTER — OFFICE VISIT (OUTPATIENT)
Dept: URGENT CARE | Facility: MEDICAL CENTER | Age: 43
End: 2018-04-26
Payer: COMMERCIAL

## 2018-04-26 VITALS
SYSTOLIC BLOOD PRESSURE: 136 MMHG | WEIGHT: 164.6 LBS | DIASTOLIC BLOOD PRESSURE: 97 MMHG | HEART RATE: 85 BPM | RESPIRATION RATE: 20 BRPM | OXYGEN SATURATION: 100 % | TEMPERATURE: 98 F | BODY MASS INDEX: 25.83 KG/M2 | HEIGHT: 67 IN

## 2018-04-26 DIAGNOSIS — M54.2 NECK PAIN: Primary | ICD-10-CM

## 2018-04-26 PROCEDURE — 99214 OFFICE O/P EST MOD 30 MIN: CPT | Performed by: NURSE PRACTITIONER

## 2018-04-26 PROCEDURE — 96372 THER/PROPH/DIAG INJ SC/IM: CPT | Performed by: NURSE PRACTITIONER

## 2018-04-26 RX ORDER — KETOROLAC TROMETHAMINE 30 MG/ML
60 INJECTION, SOLUTION INTRAMUSCULAR; INTRAVENOUS ONCE
Status: COMPLETED | OUTPATIENT
Start: 2018-04-26 | End: 2018-04-26

## 2018-04-26 RX ORDER — CYCLOBENZAPRINE HCL 5 MG
5 TABLET ORAL 3 TIMES DAILY PRN
Qty: 30 TABLET | Refills: 0 | Status: SHIPPED | OUTPATIENT
Start: 2018-04-26 | End: 2018-09-17 | Stop reason: ALTCHOICE

## 2018-04-26 RX ADMIN — KETOROLAC TROMETHAMINE 60 MG: 30 INJECTION, SOLUTION INTRAMUSCULAR; INTRAVENOUS at 09:40

## 2018-04-26 NOTE — PATIENT INSTRUCTIONS
Gentle stretches, gentle massage  NSAIDs and muscle relaxants as needed  Apply heat locally  Elevate extremities when at rest    Advance activity as tolerated  Wear supportive shoes  Follow up with CT surgery as discussed or go to nearest ED if any signs of distress
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
## 2018-04-26 NOTE — LETTER
April 26, 2018     Patient: Karrie Rivera   YOB: 1975   Date of Visit: 4/26/2018       To Whom it May Concern:    Karrie Rivera was seen in my clinic on 4/26/2018  She may return to work on Friday, April 27, 2018  If you have any questions or concerns, please don't hesitate to call           Sincerely,          NERGO Ivory        CC: No Recipients

## 2018-09-17 ENCOUNTER — OFFICE VISIT (OUTPATIENT)
Dept: VASCULAR SURGERY | Facility: CLINIC | Age: 43
End: 2018-09-17
Payer: COMMERCIAL

## 2018-09-17 VITALS
HEIGHT: 66 IN | TEMPERATURE: 98 F | SYSTOLIC BLOOD PRESSURE: 102 MMHG | RESPIRATION RATE: 16 BRPM | BODY MASS INDEX: 26.84 KG/M2 | HEART RATE: 72 BPM | WEIGHT: 167 LBS | DIASTOLIC BLOOD PRESSURE: 70 MMHG

## 2018-09-17 DIAGNOSIS — R20.2 NUMBNESS AND TINGLING IN LEFT ARM: ICD-10-CM

## 2018-09-17 DIAGNOSIS — M25.512 ACUTE PAIN OF LEFT SHOULDER: Primary | ICD-10-CM

## 2018-09-17 DIAGNOSIS — R20.0 NUMBNESS AND TINGLING IN LEFT ARM: ICD-10-CM

## 2018-09-17 PROCEDURE — 99243 OFF/OP CNSLTJ NEW/EST LOW 30: CPT | Performed by: SURGERY

## 2018-09-17 RX ORDER — IBUPROFEN 800 MG/1
TABLET ORAL EVERY 6 HOURS PRN
COMMUNITY
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
## 2018-09-17 NOTE — PATIENT INSTRUCTIONS
1) Bilateral arm symptoms  -we discussed the results of your CT scan, nerve conduction studies, exam, etc  -I am not convinced that your symptoms are due to thoracic outlet syndrome or that your imaging/testing supports this diagnosis, neurogenic type and thus I do not recommend rib resection for you  -I do encourage you to seek another opinion with the specialist at [facility] that you are planning to see

## 2018-09-20 PROBLEM — R20.0 NUMBNESS AND TINGLING IN LEFT ARM: Status: ACTIVE | Noted: 2018-09-20

## 2018-09-20 PROBLEM — M25.512 LEFT SHOULDER PAIN: Status: ACTIVE | Noted: 2018-09-20

## 2018-09-20 PROBLEM — R20.2 NUMBNESS AND TINGLING IN LEFT ARM: Status: ACTIVE | Noted: 2018-09-20

## 2020-02-03 ENCOUNTER — DOCTOR'S OFFICE (OUTPATIENT)
Dept: URBAN - METROPOLITAN AREA CLINIC 136 | Facility: CLINIC | Age: 45
Setting detail: OPHTHALMOLOGY
End: 2020-02-03
Payer: COMMERCIAL

## 2020-02-03 ENCOUNTER — OPTICAL OFFICE (OUTPATIENT)
Dept: URBAN - METROPOLITAN AREA CLINIC 143 | Facility: CLINIC | Age: 45
Setting detail: OPHTHALMOLOGY
End: 2020-02-03
Payer: COMMERCIAL

## 2020-02-03 DIAGNOSIS — H52.4: ICD-10-CM

## 2020-02-03 DIAGNOSIS — H52.223: ICD-10-CM

## 2020-02-03 DIAGNOSIS — H52.13: ICD-10-CM

## 2020-02-03 PROBLEM — G71.2 MUSCULAR DYSTROPHY: Status: ACTIVE | Noted: 2020-02-03

## 2020-02-03 PROCEDURE — V2760 SCRATCH RESISTANT COATING: HCPCS | Performed by: OPTOMETRIST

## 2020-02-03 PROCEDURE — V2103 SPHEROCYLINDR 4.00D/12-2.00D: HCPCS | Performed by: OPTOMETRIST

## 2020-02-03 PROCEDURE — 92004 COMPRE OPH EXAM NEW PT 1/>: CPT | Performed by: OPTOMETRIST

## 2020-02-03 PROCEDURE — V2020 VISION SVCS FRAMES PURCHASES: HCPCS | Performed by: OPTOMETRIST

## 2020-02-03 PROCEDURE — 92015 DETERMINE REFRACTIVE STATE: CPT | Performed by: OPTOMETRIST

## 2020-02-03 ASSESSMENT — REFRACTION_MANIFEST
OS_VA2: 20/
OS_ADD: +1.75
OD_VA2: 20/
OD_SPHERE: -1.50
OD_CYLINDER: -0.75
OD_VA1: 20/20-
OD_ADD: +1.75
OS_AXIS: 025
OS_CYLINDER: -0.75
OU_VA: 20/20-
OS_VA3: 20/
OS_SPHERE: -1.75
OD_VA3: 20/
OD_AXIS: 120
OS_VA1: 20/20-

## 2020-02-03 ASSESSMENT — REFRACTION_CURRENTRX
OD_CYLINDER: -0.50
OS_CYLINDER: -0.50
OD_OVR_VA: 20/
OS_OVR_VA: 20/
OD_SPHERE: -1.50
OD_VPRISM_DIRECTION: SV
OS_VPRISM_DIRECTION: SV
OD_AXIS: 117
OS_SPHERE: -1.75
OS_AXIS: 024

## 2020-02-03 ASSESSMENT — SPHEQUIV_DERIVED
OD_SPHEQUIV: -1.875
OD_SPHEQUIV: -2
OS_SPHEQUIV: -2.125
OS_SPHEQUIV: -2.125

## 2020-02-03 ASSESSMENT — REFRACTION_AUTOREFRACTION
OS_SPHERE: -1.50
OS_CYLINDER: -1.25
OS_AXIS: 047
OD_CYLINDER: -1.00
OD_AXIS: 104
OD_SPHERE: -1.50

## 2020-02-03 ASSESSMENT — VISUAL ACUITY
OS_BCVA: 20/25-1
OD_BCVA: 20/25

## 2020-02-03 ASSESSMENT — CONFRONTATIONAL VISUAL FIELD TEST (CVF)
OD_FINDINGS: FULL
OS_FINDINGS: FULL

## 2023-08-25 ENCOUNTER — TRANSCRIBE ORDERS (OUTPATIENT)
Dept: GASTROENTEROLOGY | Facility: CLINIC | Age: 48
End: 2023-08-25

## 2023-08-25 ENCOUNTER — TELEPHONE (OUTPATIENT)
Dept: GASTROENTEROLOGY | Facility: CLINIC | Age: 48
End: 2023-08-25

## 2023-08-25 ENCOUNTER — CONSULT (OUTPATIENT)
Dept: GASTROENTEROLOGY | Facility: CLINIC | Age: 48
End: 2023-08-25
Payer: COMMERCIAL

## 2023-08-25 VITALS
SYSTOLIC BLOOD PRESSURE: 100 MMHG | TEMPERATURE: 98.5 F | DIASTOLIC BLOOD PRESSURE: 62 MMHG | HEIGHT: 67 IN | WEIGHT: 118.6 LBS | BODY MASS INDEX: 18.62 KG/M2

## 2023-08-25 DIAGNOSIS — R13.10 DYSPHAGIA, UNSPECIFIED TYPE: ICD-10-CM

## 2023-08-25 DIAGNOSIS — K52.9 CHRONIC DIARRHEA: ICD-10-CM

## 2023-08-25 DIAGNOSIS — R68.81 EARLY SATIETY: ICD-10-CM

## 2023-08-25 DIAGNOSIS — R10.84 GENERALIZED ABDOMINAL PAIN: Primary | ICD-10-CM

## 2023-08-25 DIAGNOSIS — R63.4 WEIGHT LOSS, ABNORMAL: ICD-10-CM

## 2023-08-25 PROCEDURE — 99244 OFF/OP CNSLTJ NEW/EST MOD 40: CPT | Performed by: INTERNAL MEDICINE

## 2023-08-25 RX ORDER — TOPIRAMATE 50 MG/1
50 TABLET, FILM COATED ORAL 2 TIMES DAILY
COMMUNITY
Start: 2023-08-21

## 2023-08-25 RX ORDER — CELECOXIB 200 MG/1
200 CAPSULE ORAL 2 TIMES DAILY
COMMUNITY
Start: 2023-06-20

## 2023-08-25 RX ORDER — POLYETHYLENE GLYCOL 3350 17 G/17G
238 POWDER, FOR SOLUTION ORAL ONCE
Qty: 238 G | Refills: 0 | Status: SHIPPED | OUTPATIENT
Start: 2023-08-25 | End: 2023-08-25

## 2023-08-25 RX ORDER — GABAPENTIN 600 MG/1
1200 TABLET ORAL 3 TIMES DAILY
COMMUNITY
Start: 2023-07-23

## 2023-08-25 RX ORDER — TIZANIDINE 2 MG/1
4 TABLET ORAL 3 TIMES DAILY
COMMUNITY
Start: 2023-07-14

## 2023-08-25 RX ORDER — ATORVASTATIN CALCIUM 10 MG/1
TABLET, FILM COATED ORAL
COMMUNITY
Start: 2023-06-15

## 2023-08-25 RX ORDER — BISACODYL 5 MG/1
20 TABLET, DELAYED RELEASE ORAL ONCE
Qty: 4 TABLET | Refills: 0 | Status: SHIPPED | OUTPATIENT
Start: 2023-08-25 | End: 2023-08-25

## 2023-08-25 NOTE — PROGRESS NOTES
[facility] - Outpatient Consultation  Stefany Morse 50 y.o. female MRN: [id number]  Encounter: [id number]          ASSESSMENT AND PLAN:      1. Generalized abdominal pain  2. Dysphagia  3. Chronic diarrhea  4. Early satiety  5. Abnormal weight loss    Differentials include peptic ulcer disease, gastritis, H. pylori infection, celiac disease, IBD, tumors, IBS. Discussed with patient about evaluating with EGD, colonoscopy, CT scan, stool studies for infection inflammation and blood work. Patient agreeable. Ordered investigations. Further management plan based on investigation results. RTC 4 months. Landry Solis MD  Gastroenterology  [facility]  Date: August 25, 2023        - CT abdomen pelvis w contrast; Future  - Calprotectin,Fecal; Future  - C-reactive protein; Future  - Ova and parasite examination; Future  - Giardia Ag, EIA, Stool (3 Specimens); Future  - IgA; Future  - Tissue transglutaminase, IgA; Future  - Pancreatic elastase, fecal; Future  - bisacodyl (DULCOLAX) 5 mg EC tablet; Take 4 tablets (20 mg total) by mouth once for 1 dose Colonoscopy prep  Dispense: 4 tablet; Refill: 0  - polyethylene glycol (MIRALAX) 17 g packet; Take 238 g by mouth once for 1 dose For bowel prep. Mix in 64 oz of gatorade. Drink 32 oz at the rate of 8 oz/1 glass every 15 mins starting at 5 pm on the evening prior to day of procedure. Drink the remaining 32 oz 5 hours prior to procedure time at at the rate of 8 oz/1 glass every 15 mins until finished. Dispense: 238 g; Refill: 0  - EGD; Future  - Colonoscopy; Future      ______________________________________________________________________    HPI: 59-year-old with muscular dystrophy presents for evaluation. Patient denies any nausea, vomiting, heartburn. She has swallowing problems which are chronic but have been worsening lately. No choking or coughing when she takes bites or drinks water.   She has been having abdominal pain for a while in the epigastrium. She is had 50 pound weight loss and loss of appetite. She feels full early with meal intake. She has been having loose stools daily for the last few months. She noticed lump in her abdomen which moves across her belly and usually when that happens her belly becomes really hard to touch. No blood in stools. No family history of colon cancer. She takes Celebrex rarely for pain. She takes daily gabapentin. She smokes cigarettes. No marijuana intake. No alcohol intake. She had a colonoscopy 6 to 7 years ago. Labs done in 2022 show normal blood counts, CRP and ESR upon my review of the labs today. Labs from 2022 were reviewed by me today and showed normal renal function and liver enzymes. No recent GI imaging to review inpatient records. REVIEW OF SYSTEMS:    CONSTITUTIONAL: Denies any fever, chills, rigors, and weight loss. HEENT: No earache or tinnitus. Denies hearing loss or visual disturbances. CARDIOVASCULAR: No chest pain or palpitations. RESPIRATORY: Denies any cough, hemoptysis, shortness of breath or dyspnea on exertion. GASTROINTESTINAL: As noted in the History of Present Illness. GENITOURINARY: No problems with urination. Denies any hematuria or dysuria. NEUROLOGIC: No dizziness or vertigo, denies headaches. MUSCULOSKELETAL: Denies any muscle or joint pain. SKIN: Denies skin rashes or itching. ENDOCRINE: Denies excessive thirst. Denies intolerance to heat or cold. PSYCHOSOCIAL: Denies depression or anxiety. Denies any recent memory loss.        Historical Information   Past Medical History:   Diagnosis Date   • Abdominal pain    • Chronic pain disorder     neck pain   • GERD (gastroesophageal reflux disease)    • Heartburn     occ   • Mastodynia    • Obesity    • Request for sterilization    • Thyromegaly      Past Surgical History:   Procedure Laterality Date   • INDUCED      • MOUTH SURGERY     • [place] LAPAROSCOPY W/RMVL ADNEXAL STRUCTURES Bilateral 3/8/2017    Procedure: SALPINGECTOMY;  Surgeon: Jose F Bowles MD;  Location: [facility];  Service: Gynecology     Social History   Social History     Substance and Sexual Activity   Alcohol Use Not Currently    Comment: few x year     Social History     Substance and Sexual Activity   Drug Use No     Social History     Tobacco Use   Smoking Status Every Day   • Years: 10.00   • Types: Cigarettes   Smokeless Tobacco Never     Family History   Problem Relation Age of Onset   • Hypertension Mother    • Hypertension Father    • Prostate cancer Father        Meds/Allergies       Current Outpatient Medications:   •  atorvastatin (LIPITOR) 10 mg tablet  •  bisacodyl (DULCOLAX) 5 mg EC tablet  •  celecoxib (CeleBREX) 200 mg capsule  •  gabapentin (NEURONTIN) 600 MG tablet  •  polyethylene glycol (MIRALAX) 17 g packet  •  tiZANidine (ZANAFLEX) 2 mg tablet  •  topiramate (TOPAMAX) 50 MG tablet  •  TRAZODONE HCL PO  •  ibuprofen (MOTRIN) 800 mg tablet    Allergies   Allergen Reactions   • Penicillins Rash     Category: Allergy;            Objective     Blood pressure 100/62, temperature 98.5 °F (36.9 °C), temperature source Tympanic, height 5' 7" (1.702 m), weight 53.8 kg (118 lb 9.6 oz), not currently breastfeeding. Body mass index is 18.58 kg/m². PHYSICAL EXAM:      General Appearance:   Alert, cooperative, no distress   HEENT:   Normocephalic, atraumatic, anicteric.     Neck:  Supple, symmetrical, trachea midline   Lungs:   Clear to auscultation bilaterally; no rales, rhonchi or wheezing; respirations unlabored    Heart[de-identified]   Regular rate and rhythm; no murmur, rub, or gallop.    Abdomen:   Soft, non-tender, non-distended; normal bowel sounds; no masses, no organomegaly    Genitalia:   Deferred    Rectal:   Deferred    Extremities:  No cyanosis, clubbing or edema    Pulses:  2+ and symmetric    Skin:  No jaundice, rashes, or lesions    Lymph nodes:  No palpable cervical lymphadenopathy        Lab Results:   No visits with results within 1 Day(s) from this visit. Latest known visit with results is:   Admission on 05/03/2017, Discharged on 05/03/2017   Component Date Value   • Sodium 05/03/2017 140    • Potassium 05/03/2017 4.1    • Chloride 05/03/2017 106    • CO2 05/03/2017 25    • ANION GAP 05/03/2017 9    • BUN 05/03/2017 15    • Creatinine 05/03/2017 0.80    • Glucose 05/03/2017 82    • Calcium 05/03/2017 9.8    • eGFR 05/03/2017 >60.0    • WBC 05/03/2017 8.83    • RBC 05/03/2017 4.26    • Hemoglobin 05/03/2017 13.1    • Hematocrit 05/03/2017 38.0    • MCV 05/03/2017 89    • MCH 05/03/2017 30.8    • MCHC 05/03/2017 34.5    • RDW 05/03/2017 13.3    • MPV 05/03/2017 10.0    • Platelets 26/05/9993 219    • nRBC 05/03/2017 0    • Neutrophils Relative 05/03/2017 61    • Lymphocytes Relative 05/03/2017 27    • Monocytes Relative 05/03/2017 9    • Eosinophils Relative 05/03/2017 2    • Basophils Relative 05/03/2017 1    • Neutrophils Absolute 05/03/2017 5.43    • Lymphocytes Absolute 05/03/2017 2.41    • Monocytes Absolute 05/03/2017 0.77    • Eosinophils Absolute 05/03/2017 0.17    • Basophils Absolute 05/03/2017 0.05          Radiology Results:   No results found.

## 2023-08-25 NOTE — PATIENT INSTRUCTIONS
Scheduled date of EGD/colonoscopy (as of today):09.28.23  Physician performing EGD/colonoscopy:DR BAZZI  [facility] of [facility]GD[facility]colonoscopy[facility]  Desired bowel prep reviewed with patient:DULCOLAX/MIRALAX  Instructions reviewed with patient by:CARRIE  Clearances:   N/A  PT WILL CALL TO SCHEDULE CT

## 2023-09-06 DIAGNOSIS — K52.9 CHRONIC DIARRHEA: ICD-10-CM

## 2023-09-06 DIAGNOSIS — R10.84 GENERALIZED ABDOMINAL PAIN: Primary | ICD-10-CM

## 2023-09-06 RX ORDER — LOPERAMIDE HYDROCHLORIDE 2 MG/1
2 TABLET ORAL 4 TIMES DAILY PRN
Qty: 60 TABLET | Refills: 5 | Status: SHIPPED | OUTPATIENT
Start: 2023-09-06 | End: 2024-03-04

## 2023-09-06 RX ORDER — DICYCLOMINE HYDROCHLORIDE 10 MG/1
10 CAPSULE ORAL 4 TIMES DAILY PRN
Qty: 60 CAPSULE | Refills: 5 | Status: SHIPPED | OUTPATIENT
Start: 2023-09-06 | End: 2024-03-04

## 2023-09-20 ENCOUNTER — TELEPHONE (OUTPATIENT)
Dept: GASTROENTEROLOGY | Facility: CLINIC | Age: 48
End: 2023-09-20

## 2023-09-20 DIAGNOSIS — R10.84 GENERALIZED ABDOMINAL PAIN: ICD-10-CM

## 2023-09-20 DIAGNOSIS — K52.9 CHRONIC DIARRHEA: ICD-10-CM

## 2023-09-20 RX ORDER — DICYCLOMINE HYDROCHLORIDE 10 MG/1
CAPSULE ORAL
Qty: 360 CAPSULE | Refills: 1 | Status: SHIPPED | OUTPATIENT
Start: 2023-09-20

## 2023-09-27 ENCOUNTER — ANESTHESIA (OUTPATIENT)
Dept: ANESTHESIOLOGY | Facility: HOSPITAL | Age: 48
End: 2023-09-27

## 2023-09-27 ENCOUNTER — ANESTHESIA EVENT (OUTPATIENT)
Dept: ANESTHESIOLOGY | Facility: HOSPITAL | Age: 48
End: 2023-09-27

## 2023-09-27 RX ORDER — SODIUM CHLORIDE 9 MG/ML
125 INJECTION, SOLUTION INTRAVENOUS CONTINUOUS
Status: CANCELLED | OUTPATIENT
Start: 2023-09-27

## 2023-09-27 NOTE — ANESTHESIA PREPROCEDURE EVALUATION
Procedure:  PRE-OP ONLY    Relevant Problems   ANESTHESIA (within normal limits)      CARDIO (within normal limits)      ENDO (within normal limits)      GI/HEPATIC (within normal limits)      /RENAL (within normal limits)      GYN (within normal limits)      HEMATOLOGY (within normal limits)      MUSCULOSKELETAL (within normal limits)      NEURO/PSYCH   (+) Numbness and tingling in left arm      PULMONARY (within normal limits)             Anesthesia Plan  ASA Score- 2     Anesthesia Type- IV sedation with anesthesia with ASA Monitors. Additional Monitors:   Airway Plan:           Plan Factors-Exercise tolerance (METS): >4 METS. Chart reviewed. Patient summary reviewed. Patient is a current smoker. Patient instructed to abstain from smoking on day of procedure. Patient smoked on day of surgery. Induction- intravenous. Postoperative Plan-     Informed Consent- Anesthetic plan and risks discussed with patient.

## 2023-09-28 ENCOUNTER — HOSPITAL ENCOUNTER (OUTPATIENT)
Dept: GASTROENTEROLOGY | Facility: MEDICAL CENTER | Age: 48
Setting detail: OUTPATIENT SURGERY
Discharge: HOME | End: 2023-09-28
Payer: COMMERCIAL

## 2023-09-28 ENCOUNTER — ANESTHESIA (OUTPATIENT)
Dept: GASTROENTEROLOGY | Facility: MEDICAL CENTER | Age: 48
End: 2023-09-28

## 2023-09-28 ENCOUNTER — ANESTHESIA EVENT (OUTPATIENT)
Dept: GASTROENTEROLOGY | Facility: MEDICAL CENTER | Age: 48
End: 2023-09-28

## 2023-09-28 VITALS
TEMPERATURE: 97.9 F | RESPIRATION RATE: 20 BRPM | BODY MASS INDEX: 18.05 KG/M2 | WEIGHT: 115 LBS | SYSTOLIC BLOOD PRESSURE: 107 MMHG | OXYGEN SATURATION: 99 % | HEART RATE: 87 BPM | HEIGHT: 67 IN | DIASTOLIC BLOOD PRESSURE: 62 MMHG

## 2023-09-28 DIAGNOSIS — K52.9 CHRONIC DIARRHEA: ICD-10-CM

## 2023-09-28 DIAGNOSIS — R13.10 DYSPHAGIA, UNSPECIFIED TYPE: ICD-10-CM

## 2023-09-28 DIAGNOSIS — R68.81 EARLY SATIETY: ICD-10-CM

## 2023-09-28 DIAGNOSIS — R63.4 WEIGHT LOSS, ABNORMAL: ICD-10-CM

## 2023-09-28 DIAGNOSIS — R10.84 GENERALIZED ABDOMINAL PAIN: ICD-10-CM

## 2023-09-28 PROBLEM — F17.200 SMOKING: Status: ACTIVE | Noted: 2023-09-28

## 2023-09-28 LAB
EXT PREGNANCY TEST URINE: NEGATIVE
EXT. CONTROL: NORMAL

## 2023-09-28 PROCEDURE — 81025 URINE PREGNANCY TEST: CPT | Performed by: ANESTHESIOLOGY

## 2023-09-28 RX ORDER — LIDOCAINE HYDROCHLORIDE 20 MG/ML
INJECTION, SOLUTION EPIDURAL; INFILTRATION; INTRACAUDAL; PERINEURAL AS NEEDED
Status: DISCONTINUED | OUTPATIENT
Start: 2023-09-28 | End: 2023-09-28

## 2023-09-28 RX ORDER — PROPOFOL 10 MG/ML
INJECTION, EMULSION INTRAVENOUS AS NEEDED
Status: DISCONTINUED | OUTPATIENT
Start: 2023-09-28 | End: 2023-09-28

## 2023-09-28 RX ORDER — SODIUM CHLORIDE 9 MG/ML
125 INJECTION, SOLUTION INTRAVENOUS CONTINUOUS
Status: DISCONTINUED | OUTPATIENT
Start: 2023-09-28 | End: 2023-10-02 | Stop reason: HOSPADM

## 2023-09-28 RX ADMIN — PROPOFOL 100 MG: 10 INJECTION, EMULSION INTRAVENOUS at 14:52

## 2023-09-28 RX ADMIN — PROPOFOL 100 MG: 10 INJECTION, EMULSION INTRAVENOUS at 14:48

## 2023-09-28 RX ADMIN — SODIUM CHLORIDE 125 ML/HR: 0.9 INJECTION, SOLUTION INTRAVENOUS at 14:21

## 2023-09-28 RX ADMIN — LIDOCAINE HYDROCHLORIDE 60 MG: 20 INJECTION, SOLUTION EPIDURAL; INFILTRATION; INTRACAUDAL at 14:48

## 2023-09-28 NOTE — PERIOPERATIVE NURSING NOTE
Procedures cancelled due to cough and airway issues managing secretions following anesthesia administration.

## 2023-09-28 NOTE — H&P
History and Physical - [facility]  Andrew Willett 50 y.o. female MRN: [id number]                  HPI: Andrew Willett is a 50y.o. year old female who presents for EGD and colonoscopy to investigate abdominal pain, dysphagia, early satiety, chronic diarrhea and abnormal weight loss. Procedures could not be started as patient developed bronchospasm, chest congestion and could not breathe well with sedation. REVIEW OF SYSTEMS: Per the HPI, and otherwise unremarkable. Historical Information   Past Medical History:   Diagnosis Date   • Abdominal pain    • Asthma    • Chronic pain disorder     neck pain   • GERD (gastroesophageal reflux disease)    • Heartburn     occ   • Mastodynia    • Obesity    • Request for sterilization    • Thyromegaly      Past Surgical History:   Procedure Laterality Date   • INDUCED      • MOUTH SURGERY     • [place] LAPAROSCOPY W/RMVL ADNEXAL STRUCTURES Bilateral 3/8/2017    Procedure: SALPINGECTOMY;  Surgeon: Susi Kyle MD;  Location: [facility] OR;  Service: Gynecology     Social History   Social History     Substance and Sexual Activity   Alcohol Use Not Currently    Comment: few x year     Social History     Substance and Sexual Activity   Drug Use No     Social History     Tobacco Use   Smoking Status Every Day   • Years: 10.00   • Types: Cigarettes   Smokeless Tobacco Never     Family History   Problem Relation Age of Onset   • Hypertension Mother    • Hypertension Father    • Prostate cancer Father        Meds/Allergies     (Not in a hospital admission)      Allergies   Allergen Reactions   • Penicillins Rash     Category: Allergy;        Objective     Blood pressure 141/77, pulse 56, temperature 97.9 °F (36.6 °C), temperature source Temporal, resp. rate 18, height 5' 7" (1.702 m), weight 52.2 kg (115 lb), SpO2 100 %, not currently breastfeeding.       PHYSICAL EXAM    Gen: NAD  CV: RRR  CHEST: Clear  ABD: soft, NT/ND  EXT: no edema      ASSESSMENT/PLAN:  Yara Cooper is a 50y.o. year old female who presents for EGD and colonoscopy to investigate abdominal pain, dysphagia, early satiety, chronic diarrhea and abnormal weight loss. The patient is stable and optimized for the procedure, we reviewed risk and benefits. Risk include but not limited to infection, bleeding, perforation and missing a lesion. Procedures could not be started as patient developed bronchospasm, chest congestion and could not breathe well with sedation. Patient will need to get appointment with PCP for further management of chronic lung disease and after optimal control of chronic lung disease, we will bring her back to the procedures likely with endotracheal intubation.

## 2023-09-28 NOTE — ANESTHESIA POSTPROCEDURE EVALUATION
Post-Op Assessment Note    CV Status:  Stable    Pain management: adequate     Mental Status:  Alert and awake   Hydration Status:  Euvolemic   PONV Controlled:  Controlled   Airway Patency:  Patent      Post Op Vitals Reviewed: Yes      Staff: Anesthesiologist         No notable events documented.     BP      Temp      Pulse     Resp      SpO2      /62   Pulse 87   Temp 97.9 °F (36.6 °C) (Temporal)   Resp 20   Ht 5' 7" (1.702 m)   Wt 52.2 kg (115 lb)   SpO2 99%   BMI 18.01 kg/m²

## 2024-02-04 NOTE — LETTER
February 26, 2018     Patient: Goldie Oh   YOB: 1975   Date of Visit: 2/26/2018       To Whom It May Concern: It is my medical opinion that Goldie Oh may return to work on 2/28/2018  If you have any questions or concerns, please don't hesitate to call           Sincerely,        Martinez Cotton MD    CC: No Recipients no

## (undated) DEVICE — SCD SEQUENTIAL COMPRESSION COMFORT SLEEVE MEDIUM KNEE LENGTH: Brand: KENDALL SCD

## (undated) DEVICE — UNIVERSAL GYN LAPAROSCOPY,KIT: Brand: CARDINAL HEALTH

## (undated) DEVICE — [HIGH FLOW INSUFFLATOR,  DO NOT USE IF PACKAGE IS DAMAGED,  KEEP DRY,  KEEP AWAY FROM SUNLIGHT,  PROTECT FROM HEAT AND RADIOACTIVE SOURCES.]: Brand: PNEUMOSURE

## (undated) DEVICE — SUT VICRYL 4-0 PS-2 27 IN J426H

## (undated) DEVICE — INTENDED FOR TISSUE SEPARATION, AND OTHER PROCEDURES THAT REQUIRE A SHARP SURGICAL BLADE TO PUNCTURE OR CUT.: Brand: BARD-PARKER SAFETY BLADES SIZE 11, STERILE

## (undated) DEVICE — ENDOPATH XCEL BLADELESS TROCARS WITH STABILITY SLEEVES: Brand: ENDOPATH XCEL

## (undated) DEVICE — CHLORAPREP HI-LITE 26ML ORANGE

## (undated) DEVICE — 3000CC GUARDIAN II: Brand: GUARDIAN

## (undated) DEVICE — GLOVE SRG BIOGEL ECLIPSE 7

## (undated) DEVICE — AEM CORD

## (undated) DEVICE — GLOVE INDICATOR PI UNDERGLOVE SZ 6.5 BLUE

## (undated) DEVICE — WET SKIN PREP TRAY: Brand: MEDLINE INDUSTRIES, INC.

## (undated) DEVICE — SUT VICRYL 0 UR-6 27 IN J603H

## (undated) DEVICE — GLOVE INDICATOR PI UNDERGLOVE SZ 8 BLUE

## (undated) DEVICE — ENDOPATH XCEL UNIVERSAL TROCAR STABLILITY SLEEVES: Brand: ENDOPATH XCEL

## (undated) DEVICE — UTERINE MANIPULATOR 4.5MM STRL

## (undated) DEVICE — INTENDED FOR TISSUE SEPARATION, AND OTHER PROCEDURES THAT REQUIRE A SHARP SURGICAL BLADE TO PUNCTURE OR CUT.: Brand: BARD-PARKER ® CARBON RIB-BACK BLADES

## (undated) DEVICE — GLOVE INDICATOR PI UNDERGLOVE SZ 7 BLUE

## (undated) DEVICE — GLOVE SRG BIOGEL 7

## (undated) DEVICE — SYRINGE 50ML LL

## (undated) DEVICE — BLUE HEAT SCOPE WARMER

## (undated) DEVICE — GLOVE SRG BIOGEL 8

## (undated) DEVICE — LAP AEM SCISSOR TIP .75 IN

## (undated) DEVICE — 3M™ STERI-STRIP™ REINFORCED ADHESIVE SKIN CLOSURES, R1547, 1/2 IN X 4 IN (12 MM X 100 MM), 6 STRIPS/ENVELOPE: Brand: 3M™ STERI-STRIP™

## (undated) DEVICE — PLASTIC ADHESIVE BANDAGE: Brand: CURITY